# Patient Record
Sex: MALE | Race: WHITE | Employment: FULL TIME | URBAN - METROPOLITAN AREA
[De-identification: names, ages, dates, MRNs, and addresses within clinical notes are randomized per-mention and may not be internally consistent; named-entity substitution may affect disease eponyms.]

---
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
## 2022-04-06 PROCEDURE — 99282 EMERGENCY DEPT VISIT SF MDM: CPT

## 2022-04-07 ENCOUNTER — HOSPITAL ENCOUNTER (EMERGENCY)
Age: 22
Discharge: HOME | End: 2022-04-07
Attending: EMERGENCY MEDICINE
Payer: MEDICARE

## 2022-04-07 VITALS
TEMPERATURE: 98 F | HEART RATE: 72 BPM | WEIGHT: 185.2 LBS | RESPIRATION RATE: 17 BRPM | OXYGEN SATURATION: 98 % | SYSTOLIC BLOOD PRESSURE: 155 MMHG | HEIGHT: 70 IN | BODY MASS INDEX: 26.51 KG/M2 | DIASTOLIC BLOOD PRESSURE: 77 MMHG
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
## 2022-04-07 LAB
HAV IGM SER IA-ACNC: NORMAL
HEPATITIS B CORE IGM ANTIBODY: NORMAL
HEPATITIS B SURFACE ANTIGEN INTERPRETATION: NORMAL
HEPATITIS C ANTIBODY INTERPRETATION: NORMAL

## 2022-04-07 PROCEDURE — 80074 ACUTE HEPATITIS PANEL: CPT

## 2022-04-07 PROCEDURE — 86702 HIV-2 ANTIBODY: CPT

## 2022-04-07 PROCEDURE — 87390 HIV-1 AG IA: CPT

## 2022-04-07 PROCEDURE — 86701 HIV-1ANTIBODY: CPT

## 2022-04-07 NOTE — ED NOTES
Discharge instructions provided to patient by RN at bedside. No further concerns addressed at this time.      Brayden Hatch RN  04/07/22 [id number]

## 2022-04-07 NOTE — ED PROVIDER NOTES
[facility] RESIDENT NOTE       Date of evaluation: 4/6/2022    Chief Complaint     Body Fluid Exposure      History of Present Illness     Guru Hernández is a 25 y.o. male with a history of ADHD who presents following a needlestick injury. His friend had been stuck by a needle found in a trash bag at their work cleaning offices. A needle and a trash bag pulled out of the teachers on poked his friend. The needle then fell to the floor and while trying to retrieve it the patient poked himself in the right index finger. Did draw blood. The patient denies having any previous medical problems are known preceding HIV or hepatitis C infection. Review of Systems     Review of systems is negative except for items mentioned above in HPI. Past Medical, Surgical, Family, and Social History     He has a past medical history of ADHD (attention deficit hyperactivity disorder). He has a past surgical history that includes Nose surgery; Wrist surgery (Left); and Arm Surgery (Left, 9/10/2020). His family history is not on file. He reports that he has never smoked. He has never used smokeless tobacco. He reports that he does not drink alcohol and does not use drugs. Medications     Previous Medications    No medications on file       Allergies     He has No Known Allergies. Physical Exam     INITIAL VITALS: BP: (!) 155/77, Temp: 98 °F (36.7 °C), Heart Rate: 72, Resp: 17, SpO2: 98 %   Physical Exam  Constitutional:       Appearance: He is well-developed. HENT:      Head: Normocephalic and atraumatic. Cardiovascular:      Rate and Rhythm: Regular rhythm. Pulmonary:      Effort: Pulmonary effort is normal.   Abdominal:      Palpations: Abdomen is soft. Musculoskeletal:      Cervical back: Normal range of motion. Skin:     General: Skin is warm and dry.       Comments: hemostatic area, no visible puncture wound   Neurological:      Mental Status: He is alert and oriented to person, place, and time. DiagnosticResults     EKG   Interpreted in conjunction with emergencydepartment physician No att. providers found    None    RADIOLOGY:  No orders to display       LABS:   No results found for this visit on 04/07/22. ED BEDSIDE ULTRASOUND:  None    RECENT VITALS:  BP: (!) 155/77, Temp: 98 °F (36.7 °C), Heart Rate: 72,Resp: 17, SpO2: 98 %     Procedures     Procedures    ED Course     Nursing Notes, Past Medical Hx, Past Surgical Hx, Social Hx, Allergies, and Family Hx were reviewed. The patient was given the followingmedications:  No orders of the defined types were placed in this encounter. CONSULTS:  None    MEDICAL DECISION MAKING / ASSESSMENT / Mikaela Ahumada is a 25 y.o. male without history presenting today after a needlestick from an unknown needle. Hemostatic puncture wound to the right hand. No known HIV or as previously. Based on CDC guidance, will perform baseline testing for HIV and hepatitis.       No PEP is indicated at this time given very low probability of transmission.      Encouraged to file under Worker's Comp who will follow up. Low risk. [facility]     Return precautions given. Baseline labs sent. Patient discharged ambulatory.        This patient was also evaluated by the attending physician. All care plans werediscussed and agreed upon. .           This patient was also evaluated by the attending physician. All care plans werediscussed and agreed upon. Clinical Impression     1. Exposure to body fluid due to accidental needlestick injury        Disposition     PATIENT REFERRED TO:  No follow-up provider specified.     DISCHARGE MEDICATIONS:  New Prescriptions    No medications on file       Elian Nuñez MD  Resident  04/07/22 9388       Aby Fox MD  Resident  06/06/22 9967

## 2022-04-07 NOTE — ED PROVIDER NOTES
ED Attending Attestation Note     Date of evaluation: 4/6/2022    This patient was seen by the resident. I have seen and examined the patient, agree with the workup, evaluation, management and diagnosis. The care plan has been discussed. My assessment reveals complaints of accidental needlestick. Patient brought a coworker to the emergency department who had sustained an accidental needlestick. The coworker had placed the needle in his pocket and it fell out and when the patient went to pick it up he stuck himself in the finger. Patient did irrigate the finger immediately. The needle had no visible bloody contamination and the puncture wound was very superficial.  No indication for postexposure prophylaxis.      Bruce Meeks MD  04/07/22 9191

## 2022-04-08 LAB
HIV AG/AB: NORMAL
HIV ANTIGEN: NORMAL
HIV-1 ANTIBODY: NORMAL
HIV-2 AB: NORMAL

## 2023-02-28 ENCOUNTER — OFFICE VISIT (OUTPATIENT)
Dept: CARDIOLOGY CLINIC | Age: 23
End: 2023-02-28

## 2023-02-28 VITALS
HEIGHT: 70 IN | HEART RATE: 88 BPM | BODY MASS INDEX: 31.9 KG/M2 | WEIGHT: 222.8 LBS | DIASTOLIC BLOOD PRESSURE: 82 MMHG | SYSTOLIC BLOOD PRESSURE: 120 MMHG | OXYGEN SATURATION: 98 %

## 2023-02-28 DIAGNOSIS — I49.9 IRREGULAR HEART RATE: Primary | ICD-10-CM

## 2023-02-28 DIAGNOSIS — I49.1 PREMATURE ATRIAL CONTRACTIONS: ICD-10-CM

## 2023-02-28 DIAGNOSIS — R00.2 PALPITATIONS: ICD-10-CM

## 2023-02-28 NOTE — PATIENT INSTRUCTIONS
RECOMMENDATIONS:  Premature atrial contractions (PAC's)  Use Apple Watch to record ECG if you have symptoms. You can upload to [address] Follow up as needed.

## 2023-02-28 NOTE — PROGRESS NOTES
[facility]   Electrophysiology Consult Note            Date: 2/28/23  Patient Name: [place]  YOB: 2000    Primary Care Physician: Libra Whitaker DO    CHIEF COMPLAINT:   Chief Complaint   Patient presents with    New Patient    Irregular Heart Beat     Wenckebach     Chest Pain     Worsening center / left sided CP      HISTORY OF PRESENT ILLNESS: [place] is a 23 y.o. male with a PMH significant for chest pain. Patient was seen in ER on 8/5/2021 fir chest pain. He was seen by cardiology in 2021 and an echo was ordered. Echo on 11/11/2021 showed EF 55%. Holter monitor in 2018 was normal.    Today, 2/28/2023, EKG demonstrates SR 77 BPM. He reports that he has a \"weird\" feeling in his chest sometimes. He describes the feeling as mid sternal, dull pain, and he has some lightheadedness. The episodes last 1-2 minutes and go away on their own. Patient denies current edema, sob, palpitations, or syncope. Past Medical History:   has a past medical history of ADHD (attention deficit hyperactivity disorder). Past Surgical History:   has a past surgical history that includes Nose surgery; Wrist surgery (Left); and Arm Surgery (Left, 9/10/2020). Allergies:  Patient has no known allergies. Social History:   reports that he has never smoked. He has never used smokeless tobacco. He reports that he does not drink alcohol and does not use drugs. Family History: family history includes Heart Attack in his paternal grandmother. Home Medications:    Prior to Admission medications    Not on File       REVIEW OF SYSTEMS:    All 14-point review of systems are completed and  pertinent positives are mentioned in the history of present illness. Other  systems are reviewed and are negative.     Physical Examination:    /82   Pulse 88   Ht 5' 10\" (1.778 m)   Wt 222 lb 12.8 oz (101.1 kg)   SpO2 98%   BMI 31.97 kg/m²      Constitutional and General Appearance:    alert, cooperative, no distress, and appears stated age  [de-identified]:    PERRLA, no cervical lymphadenopathy. No masses palpable. Normal oral  mucosa  Respiratory:  Normal excursion and expansion without use of accessory muscles  Resp Auscultation: Normal breath sounds without dullness or wheezing  Cardiovascular: The apical impulse is not displaced  RRR S1S2 w/o M/G/R  Abdomen:  No masses or tenderness  Bowel sounds present  Extremities:   No Cyanosis or Clubbing   Lower extremity edema: No  Skin: Warm and dry  Neurological:  Alert and oriented. Moves all extremities well  No abnormalities of mood, affect, memory, mentation, or behavior are noted    DATA:    ECG 2/28/23: Personally reviewed. Echo 11/2021  Summary   Left ventricular systolic function is normal with ejection fraction   estimated at 55%. No regional wall motion abnormalities. Normal left ventricular diastolic filling pressure. Systolic pulmonary artery pressure (SPAP) is normal estimated at 21 mmHg   (Right atrial pressure of 3 mmHg). No significant valvular heart disease. IMPRESSION:    Premature atrial contractions. 2/28/2023  Mr. Daryel Osgood is a pleasant 23year old male with a medical history significant for palpitations and premature atrial contractions who presents from home to [facility] care. He has been doing well outside of palpitations. He reports some atypical chest pain. Review of his cardiac monitor didn't show any AVN disease but did see symptomatic PACs. We discussed pathophysiology. No interventions indicated. Continue to use Apple Watch for monitoring. I'm happy to review in the future. - Use Apple Watch to monitor palpitations. - Follow up PRN. RECOMMENDATIONS:  Premature atrial contractions (PAC's)  Use Apple Watch to record ECG if you have symptoms. You can upload to [address] Follow up as needed. QUALITY MEASURES  1. Tobacco Cessation Counseling: NA  2. Retake of BP if >140/90:   NA  3.  Documentation to PCP/referring for new patient:  Sent to PCP at close of office visit  4. CAD patient on anti-platelet: NA  5. CAD patient on STATIN therapy:  NA  6. Patient with CHF and aFib on anticoagulation:  NA     All questions and concerns were addressed to the patient/family. Alternatives to my treatment were discussed. Dr. Teddy Martinez MD  Electrophysiology  [facility], [place], operated by [facility]. [address] [place] [address]. [address]. [place], [address]  Phone: [phone]  Fax: [phone]     NOTE: This report was transcribed using voice recognition software. Every effort was made to ensure accuracy, however, inadvertent computerized transcription errors may be present. Rosie Esquivel RN, am scribing for and in the presence of Dr. Wendy Hermosillo. 02/28/23 4:07 PM   Peter Dhillon RN    I reviewed with the resident the medical history and the resident's findings on the physical examination. I discussed with the resident the patient's diagnosis and concur with the plan.

## 2023-03-02 PROBLEM — I44.1 WENCKEBACH SECOND DEGREE AV BLOCK: Status: RESOLVED | Noted: 2021-09-01 | Resolved: 2023-03-02

## 2023-03-02 PROBLEM — I49.1 PREMATURE ATRIAL CONTRACTIONS: Status: ACTIVE | Noted: 2023-03-02

## 2023-12-29 ENCOUNTER — OFFICE VISIT (OUTPATIENT)
Dept: ORTHOPEDIC SURGERY | Age: 23
End: 2023-12-29
Payer: MEDICAID

## 2023-12-29 VITALS — HEIGHT: 70 IN | WEIGHT: 222 LBS | BODY MASS INDEX: 31.78 KG/M2

## 2023-12-29 DIAGNOSIS — S62.606S CLOSED NONDISPLACED FRACTURE OF PHALANX OF RIGHT LITTLE FINGER, UNSPECIFIED PHALANX, SEQUELA: Primary | ICD-10-CM

## 2023-12-29 PROCEDURE — 99203 OFFICE O/P NEW LOW 30 MIN: CPT | Performed by: STUDENT IN AN ORGANIZED HEALTH CARE EDUCATION/TRAINING PROGRAM

## 2023-12-29 NOTE — PROGRESS NOTES
exam: Breathing easy and unlabored on room air  Cardiovascular: Extremities warm and well perfused with brisk capillary refill, no significant edema. Lymphatic: No obvious lymphadenopathy  Neuro: Alert and oriented to person, place, time, and situation. No focal, motor, or sensory deficit except as noted below. Psychiatric: Mood and affect normal and appropriate       Right hand Exam     No deformity or abnormality on inspection, skin has normal appearance and texture consistent with age. No significant lesions or abnormalities     Mild tenderness to palpation of the volar base of his small finger PIP joint, pain with attempted hyperextension of the PIP joint    Full sensation in a median, ulnar and radial nerve distribution     On joint exam, there is no obvious dislocation, subluxation or significant laxity in either upper extremity    Patient has full digital flexion and extension, wrist flexion extension and prosupination are within normal limits     All fingertips are pink with good capillary refill and of normal temperature. No edema. No areas of ischemia or ulcers. Radiographs & Imaging     3 view X-rays of the right small finger dated 12/22/23 were reviewed independently and discussed with the patient. The films revealed: small finger volar avulsion fracture off of the P2 base    Other Diagnostic Studies and Scales     None    Assessment     [place] MATTHEW Coffey is a 26 y.o. male with small finger P2 volar avulsion fracture     Plan     We discussed this acute problem including diagnosis, prognosis, and treatment options along with risks, benefits, and alternatives. X-rays were demonstrated the patient. Is explained this is likely an avulsion fracture associated with a volar plate injury. He was advised to wear buddy straps between his small and ring finger for the next 2 weeks and to avoid weight lifting during this time.   If the patient is asymptomatic he can follow-up with me on a as needed

## 2024-01-17 ENCOUNTER — TELEPHONE (OUTPATIENT)
Dept: ORTHOPEDIC SURGERY | Age: 24
End: 2024-01-17

## 2024-01-27 ENCOUNTER — OFFICE VISIT (OUTPATIENT)
Age: 24
End: 2024-01-27

## 2024-01-27 VITALS
HEART RATE: 96 BPM | DIASTOLIC BLOOD PRESSURE: 83 MMHG | OXYGEN SATURATION: 96 % | SYSTOLIC BLOOD PRESSURE: 120 MMHG | RESPIRATION RATE: 16 BRPM | TEMPERATURE: 97.8 F

## 2024-01-27 DIAGNOSIS — R52 GENERALIZED BODY ACHES: ICD-10-CM

## 2024-01-27 DIAGNOSIS — Z20.828 EXPOSURE TO INFLUENZA: Primary | ICD-10-CM

## 2024-01-27 DIAGNOSIS — R05.9 COUGH, UNSPECIFIED TYPE: ICD-10-CM

## 2024-01-27 LAB
INFLUENZA VIRUS A RNA: NORMAL
INFLUENZA VIRUS B RNA: NORMAL
Lab: NORMAL
QC PASS/FAIL: NORMAL
SARS-COV-2 RDRP RESP QL NAA+PROBE: NEGATIVE

## 2024-01-27 RX ORDER — OSELTAMIVIR PHOSPHATE 75 MG/1
75 CAPSULE ORAL 2 TIMES DAILY
Qty: 10 CAPSULE | Refills: 0 | Status: SHIPPED | OUTPATIENT
Start: 2024-01-27 | End: 2024-02-01

## 2024-01-27 ASSESSMENT — ENCOUNTER SYMPTOMS
COUGH: 1
SINUS PRESSURE: 0
CHEST TIGHTNESS: 0
SORE THROAT: 0
ABDOMINAL PAIN: 0
WHEEZING: 0
VOMITING: 0
CONSTIPATION: 0
NAUSEA: 0
SHORTNESS OF BREATH: 0
DIARRHEA: 0

## 2024-01-27 NOTE — PROGRESS NOTES
stiffness.   Skin:  Negative for rash.       Physical Exam  Vitals and nursing note reviewed.   Constitutional:       Appearance: Normal appearance.   HENT:      Head: Normocephalic and atraumatic.      Right Ear: Tympanic membrane normal.      Left Ear: Tympanic membrane normal.      Nose: Nose normal.      Mouth/Throat:      Mouth: Mucous membranes are moist.      Pharynx: Oropharynx is clear.   Eyes:      Extraocular Movements: Extraocular movements intact.      Conjunctiva/sclera: Conjunctivae normal.   Cardiovascular:      Rate and Rhythm: Normal rate and regular rhythm.      Pulses: Normal pulses.      Heart sounds: Normal heart sounds.   Pulmonary:      Effort: Pulmonary effort is normal.      Breath sounds: Normal breath sounds. No decreased breath sounds, wheezing, rhonchi or rales.   Musculoskeletal:      Cervical back: Full passive range of motion without pain, normal range of motion and neck supple. No rigidity.   Skin:     General: Skin is warm.      Capillary Refill: Capillary refill takes less than 2 seconds.   Neurological:      Mental Status: He is alert.   Psychiatric:         Behavior: Behavior is cooperative.           An electronic signature was used to authenticate this note.    --BAILYE Castillo

## (undated) DEVICE — STERILE LATEX POWDER-FREE SURGICAL GLOVESWITH NITRILE COATING: Brand: PROTEXIS

## (undated) DEVICE — ROYAL SILK SURGICAL GOWN, XL: Brand: CONVERTORS

## (undated) DEVICE — SUTURE COAT VCRL SZ 4-0 L18IN ABSRB UD L19MM PS-2 1/2 CIR J496G

## (undated) DEVICE — COTTON UNDERCAST PADDING,CRIMPED FINISH: Brand: WEBRIL

## (undated) DEVICE — COTTON UNDERCAST PADDING,REGULAR FINISH: Brand: WEBRIL

## (undated) DEVICE — Z CONVERTED USE 2273164 BANDAGE COMPR W4INXL4 1/2YD E EC SGL LAYERED CLP CLSR ECONO

## (undated) DEVICE — SOLUTION,SALINE,IRRGATION,500ML,STRL: Brand: MEDLINE

## (undated) DEVICE — SUTURE ETHLN SZ 4-0 L18IN NONABSORBABLE BLK L19MM PS-2 3/8 1667H

## (undated) DEVICE — GLOVE SURG SZ 7 L12IN FNGR THK94MIL STD WHT ISOLEX LTX FREE

## (undated) DEVICE — Device